# Patient Record
Sex: MALE | Race: WHITE | NOT HISPANIC OR LATINO
[De-identification: names, ages, dates, MRNs, and addresses within clinical notes are randomized per-mention and may not be internally consistent; named-entity substitution may affect disease eponyms.]

---

## 2022-01-18 ENCOUNTER — TRANSCRIPTION ENCOUNTER (OUTPATIENT)
Age: 67
End: 2022-01-18

## 2022-10-10 ENCOUNTER — NON-APPOINTMENT (OUTPATIENT)
Age: 67
End: 2022-10-10

## 2022-10-16 ENCOUNTER — NON-APPOINTMENT (OUTPATIENT)
Age: 67
End: 2022-10-16

## 2023-05-14 ENCOUNTER — EMERGENCY (EMERGENCY)
Facility: HOSPITAL | Age: 68
LOS: 1 days | Discharge: HOME | End: 2023-05-14
Attending: STUDENT IN AN ORGANIZED HEALTH CARE EDUCATION/TRAINING PROGRAM
Payer: MEDICARE

## 2023-05-14 VITALS
RESPIRATION RATE: 16 BRPM | HEIGHT: 67 IN | TEMPERATURE: 98 F | HEART RATE: 71 BPM | WEIGHT: 175.05 LBS | SYSTOLIC BLOOD PRESSURE: 129 MMHG | DIASTOLIC BLOOD PRESSURE: 82 MMHG | OXYGEN SATURATION: 97 %

## 2023-05-14 VITALS
TEMPERATURE: 98 F | SYSTOLIC BLOOD PRESSURE: 130 MMHG | HEART RATE: 58 BPM | DIASTOLIC BLOOD PRESSURE: 79 MMHG | RESPIRATION RATE: 16 BRPM | OXYGEN SATURATION: 98 %

## 2023-05-14 PROCEDURE — 99284 EMERGENCY DEPT VISIT MOD MDM: CPT

## 2023-05-14 RX ORDER — LIDOCAINE 4 G/100G
1 CREAM TOPICAL ONCE
Refills: 0 | Status: COMPLETED | OUTPATIENT
Start: 2023-05-14 | End: 2023-05-14

## 2023-05-14 RX ORDER — ACETAMINOPHEN 500 MG
975 TABLET ORAL ONCE
Refills: 0 | Status: COMPLETED | OUTPATIENT
Start: 2023-05-14 | End: 2023-05-14

## 2023-05-14 RX ORDER — DIAZEPAM 5 MG
5 TABLET ORAL ONCE
Refills: 0 | Status: DISCONTINUED | OUTPATIENT
Start: 2023-05-14 | End: 2023-05-14

## 2023-05-14 RX ORDER — IBUPROFEN 200 MG
400 TABLET ORAL ONCE
Refills: 0 | Status: COMPLETED | OUTPATIENT
Start: 2023-05-14 | End: 2023-05-14

## 2023-05-14 RX ORDER — OXYCODONE HYDROCHLORIDE 5 MG/1
1 TABLET ORAL
Qty: 8 | Refills: 0
Start: 2023-05-14 | End: 2023-05-15

## 2023-05-14 RX ORDER — OXYCODONE HYDROCHLORIDE 5 MG/1
5 TABLET ORAL ONCE
Refills: 0 | Status: DISCONTINUED | OUTPATIENT
Start: 2023-05-14 | End: 2023-05-14

## 2023-05-14 RX ADMIN — Medication 975 MILLIGRAM(S): at 12:57

## 2023-05-14 RX ADMIN — OXYCODONE HYDROCHLORIDE 5 MILLIGRAM(S): 5 TABLET ORAL at 14:28

## 2023-05-14 RX ADMIN — Medication 400 MILLIGRAM(S): at 12:57

## 2023-05-14 RX ADMIN — Medication 5 MILLIGRAM(S): at 12:58

## 2023-05-14 RX ADMIN — LIDOCAINE 1 PATCH: 4 CREAM TOPICAL at 12:58

## 2023-05-14 NOTE — ED PROVIDER NOTE - NSFOLLOWUPINSTRUCTIONS_ED_ALL_ED_FT
[facility] (comprehensive, with Neurology, Orthopedic Surgery, Neurosurgery, and Physical Therapy): [phone].     Or Spine Surgeon Dr. See:    [phone]  [email or web address]  Email: [email or web address]    1. [place] Address: [address], [facility] [address] [facility], [address]. Hours: Mon-Fri 9am - 5pm and every 2nd Sat 9am - 3pm.    2. [place] Address: [address] [facility] [address] [place] [address] (Inside [facility]). Hours: Every Thursday 10am - 5pm.    3. [place] Address: [address] [facility] [facility] [address] (Inside [facility]). Hours: 10am - 5pm Every Tuesday (except 2nd) & Every 2nd Wednesday. -- Please use 1000mg Tylenol (also called acetaminophen) every 4 hours & 400mg Motrin (also called Advil or ibuprofen) every 6 hours as needed for pain/discomfort/swelling. You can get these without a prescription. Don't use more than 3500mg of Tylenol in any 24-hour period. Make sure your other prescription/over-the-counter medications don't contain any Tylenol so you don't take too much. If you have any stomach discomfort while taking Motrin, you can use TUMS or Pepcid or Zantac (these can all be bought without a prescription).  --- please use over the counter lidocaine patches as needed for pain   -- please use muscle relaxers (sent to your pharmacy) as directed.       [facility] (comprehensive, with Neurology, Orthopedic Surgery, Neurosurgery, and Physical Therapy): [phone].     Or Spine Surgeon Dr. See:    [phone]  [email or web address]  Email: [email or web address]    1. [place] Address: [address] [facility] [address] [facility], [address]. Hours: Mon-Fri 9am - 5pm and every 2nd Sat 9am - 3pm.    2. [place] Address: [address] [address] (Inside [facility]). Hours: Every Thursday 10am - 5pm.    3. [place] Address: [address] [facility] [facility] [address] (Inside [facility]). Hours: 10am - 5pm Every Tuesday (except 2nd) & Every 2nd Wednesday. -- Please use 1000mg Tylenol (also called acetaminophen) every 4 hours & 400mg Motrin (also called Advil or ibuprofen) every 6 hours as needed for pain/discomfort/swelling. You can get these without a prescription. Don't use more than 3500mg of Tylenol in any 24-hour period. Make sure your other prescription/over-the-counter medications don't contain any Tylenol so you don't take too much. If you have any stomach discomfort while taking Motrin, you can use TUMS or Pepcid or Zantac (these can all be bought without a prescription).  --- please use over the counter lidocaine patches as needed for pain   -- please use oxycodone 5mg every 8 hours as needed for pain, please do not operate machinery while taking oxycodone       [facility] (comprehensive, with Neurology, Orthopedic Surgery, Neurosurgery, and Physical Therapy): [phone].     Or Spine Surgeon Dr. See:    [phone]  [email or web address]  Email: [email or web address]    1. [place] Address: [address], [facility] [address] [facility], [address]. Hours: Mon-Fri 9am - 5pm and every 2nd Sat 9am - 3pm.    2. [place] Address: [address] [facility] [address] [place] [address] (Inside [facility]). Hours: Every Thursday 10am - 5pm.    3. [place] Address: [address] [facility] [facility] [address] (Inside [facility]). Hours: 10am - 5pm Every Tuesday (except 2nd) & Every 2nd Wednesday.

## 2023-05-14 NOTE — ED PROVIDER NOTE - ATTENDING CONTRIBUTION TO CARE
I, Georges Awad, performed a history and physical exam of the patient and discussed their management with the resident and/or advanced care provider. I reviewed the resident and/or advanced care provider's note and agree with the documented findings and plan of care except where noted. I was present and available for all procedures.      see mdm

## 2023-05-14 NOTE — ED PROVIDER NOTE - PROGRESS NOTE DETAILS
Lesly Box, PGY1, MD:  Patient reassessed after ibuprofen, Tylenol, Valium, lidocaine patch, patient reports some improvement in pain however reporting that pain is not yet at a tolerable level, ambulated patient, patient able to bear a little bit more weight on his right at this time.  Will give oxycodone, reassess. Lesly Box, PGY1, MD: pt resting comfortably, still endorsing back pain, pt received oxycodone, will continue to monitor Lesly Box, PGY1, MD: pt eating, reports feeling better, pt able to ambulate, has cane for additional support if needed. counselled pt on f/u with spine center and counselled on pain management. pt understands return precautions. Pt will have MIL drive him and his wife home.  pt ready for dc

## 2023-05-14 NOTE — ED PROVIDER NOTE - PHYSICAL EXAMINATION
GENERAL: well appearing in no acute distress, non-toxic appearing  HEAD: normocephalic, atraumatic  HEENT: normal conjunctiva  CARDIAC: regular rate and rhythm, no appreciable murmurs, 2+ pulses in UE/LE b/l  PULM: normal breath sounds, clear to ascultation bilaterally, no rales, rhonchi, wheezing  GI: abdomen nondistended, soft, nontender, no guarding, rebound tenderness  NEURO: favors left leg upon ambulation, decreased sensation in medial right foot, AAOx3  MSK: paresthesias to the medial right thigh upon sensation testing, 4.5/5 strength in the RLE compared to LLE, ROM of LE intact, +right sciatic notch tenderness. no midline spinal tenderness  SKIN: well-perfused, extremities warm, no visible rashes  PSYCH: appropriate mood and affect

## 2023-05-14 NOTE — ED ADULT NURSE NOTE - OBJECTIVE STATEMENT
68M aaox4 ambulatory with h/o ocular HTN,, came to ED with c/o worsening low back pain for few days now. 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 Denies any chills or fever, nausea, vomiting or abd pain, denies any trauma.  Seen and evaluated by ED resident, pain meds given and tolerated well.   Pending MD reassessment.

## 2023-05-14 NOTE — ED PROVIDER NOTE - CLINICAL SUMMARY MEDICAL DECISION MAKING FREE TEXT BOX
68-year-old male past medical history of ocular hypertension, L4-L5 disc herniation, presenting for evaluation of right lower back pain radiating down the right leg associate with difficulty walking, numbness in right foot, onset Wednesday after exercising with 100lb weight in the gym.  Patient is afebrile, hemodynamically stable with some decrease sensation in the medial right foot, 4.5 out of 5 strength in the right lower extremity, favors left leg with ambulation.    Most likely this is musculoskeletal pain after causing some strain to his lumbar spine with pre-existing disc herniation at the gym, unlikely to be epidural abscess given lack of fevers, low suspicion for cord compression given patient is able to ambulate, does not have bladder or bowel dysfunction, without significant weakness.  Will control pain with Tylenol, ibuprofen, Valium, lidocaine patch, will give patient referral for spine center. 68-year-old male past medical history of ocular hypertension, L4-L5 disc herniation, presenting for evaluation of right lower back pain radiating down the right leg associate with difficulty walking, numbness in right foot, onset Wednesday after exercising with 100lb weight in the gym.  Patient is afebrile, hemodynamically stable with some decrease sensation in the medial right foot, 4.5 out of 5 strength in the right lower extremity, favors left leg with ambulation.    Most likely this is musculoskeletal pain after causing some strain to his lumbar spine with pre-existing disc herniation at the gym, unlikely to be epidural abscess given lack of fevers, low suspicion for cord compression given patient is able to ambulate, does not have bladder or bowel dysfunction, without significant weakness.  Will control pain with Tylenol, ibuprofen, Valium, lidocaine patch, will give patient referral for spine center.    Georges Awad MD. agree with above. likely disc herniation with very low suspicion for spinal cored compression or cauda equina or epidural abscess. will providfe above analgesia, reassess. dispo pending reassessment but anticipate DC home with referral to see spine specialist.

## 2023-05-14 NOTE — ED PROVIDER NOTE - OBJECTIVE STATEMENT
----- Message from Candice Phan sent at 8/23/2019 11:10 AM CDT -----  Contact: pt [phone]  Patient is returning a phone call.  Who left a message for the patient:   Does patient know what this is regarding:  Terri   Comments:   
Spoke with patient and he wants to switch his Cardiologist doctor and I gave him the phone number to Cardio Dept. [phone] to call and discuss the matter, because he has a particular Doctor in mind, a Doctor Adonis Jaime.   
68-year-old male past medical history of ocular hypertension, L4-L5 disc herniation, presenting for evaluation of right lower back pain radiating down the right leg associate with difficulty walking onset Wednesday.  Patient states he was doing a lap pull down with 100 pound weight at the gym, after his third or fourth rep started experiencing the pain.  Patient has been taking Aleve without improvement in pain.  Patient reports some decrease sensation in the right leg, and weakness with walking, otherwise denies fever, bowel or bladder dysfunction, neck pain, changes in strength or sensation in the hands.
Chorioamnionitis

## 2023-05-15 ENCOUNTER — APPOINTMENT (OUTPATIENT)
Dept: ORTHOPEDIC SURGERY | Facility: CLINIC | Age: 68
End: 2023-05-15
Payer: MEDICARE

## 2023-05-15 VITALS
BODY MASS INDEX: 27.47 KG/M2 | TEMPERATURE: 98.6 F | HEART RATE: 55 BPM | HEIGHT: 67 IN | WEIGHT: 175 LBS | OXYGEN SATURATION: 97 %

## 2023-05-15 DIAGNOSIS — M54.16 RADICULOPATHY, LUMBAR REGION: ICD-10-CM

## 2023-05-15 PROBLEM — H40.059 OCULAR HYPERTENSION, UNSPECIFIED EYE: Chronic | Status: ACTIVE | Noted: 2023-05-14

## 2023-05-15 PROCEDURE — 72100 X-RAY EXAM L-S SPINE 2/3 VWS: CPT

## 2023-05-15 PROCEDURE — 99203 OFFICE O/P NEW LOW 30 MIN: CPT

## 2023-05-15 RX ORDER — CYCLOBENZAPRINE HYDROCHLORIDE 10 MG/1
10 TABLET, FILM COATED ORAL 3 TIMES DAILY
Qty: 30 | Refills: 0 | Status: ACTIVE | COMMUNITY
Start: 2023-05-15 | End: 1900-01-01

## 2023-05-15 NOTE — HISTORY OF PRESENT ILLNESS
[de-identified] : Mr. ALTAF BILLINGS  is a 68 year old male who presents with complaints of right lumbar radiculopathy since last Wednesday after he lifted heavy weights at the gym.  He has pain in his back which radiates down his right hamstring and calf.  His pain was so bad he went to the ER yesterday and was given medications.  He is slightly better today.   Normal bowel and bladder control.   Denies any recent fevers, chills, sweats, weight loss, or infection.\par \par The patients past medical history, past surgical history, medications, allergies, and social history were reviewed by me today with the patient and documented accordingly.  In addition, the patient's family history, which is noncontributory to their visit, was also reviewed.\par

## 2024-07-26 ENCOUNTER — NON-APPOINTMENT (OUTPATIENT)
Age: 69
End: 2024-07-26